# Patient Record
Sex: FEMALE | Race: WHITE | NOT HISPANIC OR LATINO
[De-identification: names, ages, dates, MRNs, and addresses within clinical notes are randomized per-mention and may not be internally consistent; named-entity substitution may affect disease eponyms.]

---

## 2019-04-20 ENCOUNTER — APPOINTMENT (OUTPATIENT)
Dept: RADIOLOGY | Facility: IMAGING CENTER | Age: 84
End: 2019-04-20
Payer: MEDICARE

## 2019-04-20 ENCOUNTER — OUTPATIENT (OUTPATIENT)
Dept: OUTPATIENT SERVICES | Facility: HOSPITAL | Age: 84
LOS: 1 days | End: 2019-04-20
Payer: MEDICARE

## 2019-04-20 DIAGNOSIS — Z00.8 ENCOUNTER FOR OTHER GENERAL EXAMINATION: ICD-10-CM

## 2019-04-20 PROCEDURE — 73502 X-RAY EXAM HIP UNI 2-3 VIEWS: CPT | Mod: 26,RT

## 2019-04-20 PROCEDURE — 73502 X-RAY EXAM HIP UNI 2-3 VIEWS: CPT

## 2019-04-20 PROCEDURE — 73502 X-RAY EXAM HIP UNI 2-3 VIEWS: CPT | Mod: 26,LT,76

## 2023-04-30 ENCOUNTER — OUTPATIENT (OUTPATIENT)
Dept: OUTPATIENT SERVICES | Facility: HOSPITAL | Age: 88
LOS: 1 days | End: 2023-04-30
Payer: MEDICARE

## 2023-04-30 ENCOUNTER — APPOINTMENT (OUTPATIENT)
Dept: CT IMAGING | Facility: IMAGING CENTER | Age: 88
End: 2023-04-30
Payer: MEDICARE

## 2023-04-30 DIAGNOSIS — Z00.8 ENCOUNTER FOR OTHER GENERAL EXAMINATION: ICD-10-CM

## 2023-04-30 PROCEDURE — 70450 CT HEAD/BRAIN W/O DYE: CPT | Mod: 26

## 2023-04-30 PROCEDURE — 70450 CT HEAD/BRAIN W/O DYE: CPT

## 2024-07-14 ENCOUNTER — INPATIENT (INPATIENT)
Facility: HOSPITAL | Age: 89
LOS: 4 days | Discharge: NURSING, REHAB OR LONG-TERM CARE | End: 2024-07-19
Attending: INTERNAL MEDICINE | Admitting: INTERNAL MEDICINE
Payer: MEDICARE

## 2024-07-14 VITALS
SYSTOLIC BLOOD PRESSURE: 129 MMHG | RESPIRATION RATE: 17 BRPM | HEIGHT: 71 IN | OXYGEN SATURATION: 98 % | TEMPERATURE: 98 F | HEART RATE: 79 BPM | WEIGHT: 160.06 LBS | DIASTOLIC BLOOD PRESSURE: 82 MMHG

## 2024-07-14 LAB
ALBUMIN SERPL ELPH-MCNC: 3.4 G/DL — SIGNIFICANT CHANGE UP (ref 3.3–5)
ALP SERPL-CCNC: 48 U/L — SIGNIFICANT CHANGE UP (ref 40–120)
ALT FLD-CCNC: 24 U/L — SIGNIFICANT CHANGE UP (ref 12–78)
ANION GAP SERPL CALC-SCNC: 11 MMOL/L — SIGNIFICANT CHANGE UP (ref 5–17)
APPEARANCE UR: CLEAR — SIGNIFICANT CHANGE UP
AST SERPL-CCNC: 24 U/L — SIGNIFICANT CHANGE UP (ref 15–37)
BASOPHILS # BLD AUTO: 0.01 K/UL — SIGNIFICANT CHANGE UP (ref 0–0.2)
BASOPHILS NFR BLD AUTO: 0.1 % — SIGNIFICANT CHANGE UP (ref 0–2)
BILIRUB SERPL-MCNC: 0.4 MG/DL — SIGNIFICANT CHANGE UP (ref 0.2–1.2)
BILIRUB UR-MCNC: NEGATIVE — SIGNIFICANT CHANGE UP
BUN SERPL-MCNC: 29 MG/DL — HIGH (ref 7–23)
CALCIUM SERPL-MCNC: 9.7 MG/DL — SIGNIFICANT CHANGE UP (ref 8.5–10.1)
CHLORIDE SERPL-SCNC: 94 MMOL/L — LOW (ref 96–108)
CO2 SERPL-SCNC: 27 MMOL/L — SIGNIFICANT CHANGE UP (ref 22–31)
COLOR SPEC: YELLOW — SIGNIFICANT CHANGE UP
CREAT SERPL-MCNC: 1.08 MG/DL — SIGNIFICANT CHANGE UP (ref 0.5–1.3)
DIFF PNL FLD: ABNORMAL
EGFR: 48 ML/MIN/1.73M2 — LOW
EOSINOPHIL # BLD AUTO: 0.11 K/UL — SIGNIFICANT CHANGE UP (ref 0–0.5)
EOSINOPHIL NFR BLD AUTO: 1.5 % — SIGNIFICANT CHANGE UP (ref 0–6)
GLUCOSE SERPL-MCNC: 130 MG/DL — HIGH (ref 70–99)
GLUCOSE UR QL: NEGATIVE MG/DL — SIGNIFICANT CHANGE UP
HCT VFR BLD CALC: 36.2 % — SIGNIFICANT CHANGE UP (ref 34.5–45)
HGB BLD-MCNC: 12.4 G/DL — SIGNIFICANT CHANGE UP (ref 11.5–15.5)
IMM GRANULOCYTES NFR BLD AUTO: 0.5 % — SIGNIFICANT CHANGE UP (ref 0–0.9)
KETONES UR-MCNC: NEGATIVE MG/DL — SIGNIFICANT CHANGE UP
LEUKOCYTE ESTERASE UR-ACNC: ABNORMAL
LYMPHOCYTES # BLD AUTO: 1.36 K/UL — SIGNIFICANT CHANGE UP (ref 1–3.3)
LYMPHOCYTES # BLD AUTO: 18.2 % — SIGNIFICANT CHANGE UP (ref 13–44)
MAGNESIUM SERPL-MCNC: 1.8 MG/DL — SIGNIFICANT CHANGE UP (ref 1.6–2.6)
MCHC RBC-ENTMCNC: 30.8 PG — SIGNIFICANT CHANGE UP (ref 27–34)
MCHC RBC-ENTMCNC: 34.3 G/DL — SIGNIFICANT CHANGE UP (ref 32–36)
MCV RBC AUTO: 90 FL — SIGNIFICANT CHANGE UP (ref 80–100)
MONOCYTES # BLD AUTO: 1.06 K/UL — HIGH (ref 0–0.9)
MONOCYTES NFR BLD AUTO: 14.2 % — HIGH (ref 2–14)
NEUTROPHILS # BLD AUTO: 4.89 K/UL — SIGNIFICANT CHANGE UP (ref 1.8–7.4)
NEUTROPHILS NFR BLD AUTO: 65.5 % — SIGNIFICANT CHANGE UP (ref 43–77)
NITRITE UR-MCNC: NEGATIVE — SIGNIFICANT CHANGE UP
NRBC # BLD: 0 /100 WBCS — SIGNIFICANT CHANGE UP (ref 0–0)
NT-PROBNP SERPL-SCNC: 295 PG/ML — SIGNIFICANT CHANGE UP (ref 0–450)
PH UR: 6 — SIGNIFICANT CHANGE UP (ref 5–8)
PLATELET # BLD AUTO: 175 K/UL — SIGNIFICANT CHANGE UP (ref 150–400)
POTASSIUM SERPL-MCNC: 3.7 MMOL/L — SIGNIFICANT CHANGE UP (ref 3.5–5.3)
POTASSIUM SERPL-SCNC: 3.7 MMOL/L — SIGNIFICANT CHANGE UP (ref 3.5–5.3)
PROT SERPL-MCNC: 6.9 GM/DL — SIGNIFICANT CHANGE UP (ref 6–8.3)
PROT UR-MCNC: SIGNIFICANT CHANGE UP MG/DL
RBC # BLD: 4.02 M/UL — SIGNIFICANT CHANGE UP (ref 3.8–5.2)
RBC # FLD: 13.2 % — SIGNIFICANT CHANGE UP (ref 10.3–14.5)
SODIUM SERPL-SCNC: 132 MMOL/L — LOW (ref 135–145)
SP GR SPEC: 1.02 — SIGNIFICANT CHANGE UP (ref 1–1.03)
TROPONIN I, HIGH SENSITIVITY RESULT: 11.1 NG/L — SIGNIFICANT CHANGE UP
TSH SERPL-MCNC: 1.28 UIU/ML — SIGNIFICANT CHANGE UP (ref 0.36–3.74)
UROBILINOGEN FLD QL: 0.2 MG/DL — SIGNIFICANT CHANGE UP (ref 0.2–1)
WBC # BLD: 7.47 K/UL — SIGNIFICANT CHANGE UP (ref 3.8–10.5)
WBC # FLD AUTO: 7.47 K/UL — SIGNIFICANT CHANGE UP (ref 3.8–10.5)

## 2024-07-14 PROCEDURE — 70450 CT HEAD/BRAIN W/O DYE: CPT | Mod: 26,MC

## 2024-07-14 PROCEDURE — 99285 EMERGENCY DEPT VISIT HI MDM: CPT

## 2024-07-14 PROCEDURE — 71046 X-RAY EXAM CHEST 2 VIEWS: CPT | Mod: 26

## 2024-07-14 PROCEDURE — 93010 ELECTROCARDIOGRAM REPORT: CPT

## 2024-07-14 PROCEDURE — 99223 1ST HOSP IP/OBS HIGH 75: CPT

## 2024-07-14 NOTE — ED ADULT TRIAGE NOTE - CHIEF COMPLAINT QUOTE
more tired than usual for the last few weeks ,not strength, went to urgent care today negative for UTI some + blood in the urine, baseline some confusion, hx stroke , 2 cardiac stent.

## 2024-07-14 NOTE — ED ADULT TRIAGE NOTE - INTERNATIONAL TRAVEL
Problem: Falls - Risk of  Goal: *Absence of Falls  Description: Document Kennedy Fall Risk and appropriate interventions in the flowsheet. Outcome: Progressing Towards Goal  Note: Fall Risk Interventions:            Medication Interventions: Patient to call before getting OOB    Elimination Interventions: Toilet paper/wipes in reach              Problem: Patient Education: Go to Patient Education Activity  Goal: Patient/Family Education  Outcome: Progressing Towards Goal     Problem: Depressed Mood (Adult/Pediatric)  Goal: *STG: Participates in treatment plan  Outcome: Progressing Towards Goal  Note: Pt participated in treatment team. Out on the unit for medications. Isolating in his room. Little interactions with peers. Denies any thoughts of self harm. Stated his back is \"feeling better\".   Goal: Interventions  Outcome: Progressing Towards Goal     Problem: Patient Education: Go to Patient Education Activity  Goal: Patient/Family Education  Outcome: Progressing Towards Goal No

## 2024-07-14 NOTE — ED ADULT NURSE NOTE - NSFALLHARMRISKINTERV_ED_ALL_ED

## 2024-07-14 NOTE — ED ADULT NURSE NOTE - OBJECTIVE STATEMENT
Pt son at the bedside, states that pt more tired than usual for the last week or so, no strength and increasingly confused. Pt went to urgent care today negative for UTI some + blood in the urine, baseline some confusion worsening over the past 3 months, hx stroke , 2 cardiac stent, hypothyroidism. No fevers, N/V/D. Been urinating more frequently. Placed on monitor, NSR.

## 2024-07-15 DIAGNOSIS — E03.9 HYPOTHYROIDISM, UNSPECIFIED: ICD-10-CM

## 2024-07-15 DIAGNOSIS — I10 ESSENTIAL (PRIMARY) HYPERTENSION: ICD-10-CM

## 2024-07-15 DIAGNOSIS — E11.9 TYPE 2 DIABETES MELLITUS WITHOUT COMPLICATIONS: ICD-10-CM

## 2024-07-15 DIAGNOSIS — I25.10 ATHEROSCLEROTIC HEART DISEASE OF NATIVE CORONARY ARTERY WITHOUT ANGINA PECTORIS: ICD-10-CM

## 2024-07-15 DIAGNOSIS — R53.1 WEAKNESS: ICD-10-CM

## 2024-07-15 LAB
ALBUMIN SERPL ELPH-MCNC: 3.1 G/DL — LOW (ref 3.3–5)
ALP SERPL-CCNC: 41 U/L — SIGNIFICANT CHANGE UP (ref 40–120)
ALT FLD-CCNC: 19 U/L — SIGNIFICANT CHANGE UP (ref 12–78)
ANION GAP SERPL CALC-SCNC: 8 MMOL/L — SIGNIFICANT CHANGE UP (ref 5–17)
AST SERPL-CCNC: 22 U/L — SIGNIFICANT CHANGE UP (ref 15–37)
BACTERIA # UR AUTO: ABNORMAL /HPF
BILIRUB SERPL-MCNC: 0.6 MG/DL — SIGNIFICANT CHANGE UP (ref 0.2–1.2)
BUN SERPL-MCNC: 21 MG/DL — SIGNIFICANT CHANGE UP (ref 7–23)
CALCIUM SERPL-MCNC: 9.1 MG/DL — SIGNIFICANT CHANGE UP (ref 8.5–10.1)
CHLORIDE SERPL-SCNC: 95 MMOL/L — LOW (ref 96–108)
CO2 SERPL-SCNC: 28 MMOL/L — SIGNIFICANT CHANGE UP (ref 22–31)
CREAT SERPL-MCNC: 0.85 MG/DL — SIGNIFICANT CHANGE UP (ref 0.5–1.3)
EGFR: 65 ML/MIN/1.73M2 — SIGNIFICANT CHANGE UP
EPI CELLS # UR: PRESENT
FLUAV AG NPH QL: SIGNIFICANT CHANGE UP
FLUBV AG NPH QL: SIGNIFICANT CHANGE UP
GLUCOSE BLDC GLUCOMTR-MCNC: 100 MG/DL — HIGH (ref 70–99)
GLUCOSE BLDC GLUCOMTR-MCNC: 107 MG/DL — HIGH (ref 70–99)
GLUCOSE BLDC GLUCOMTR-MCNC: 119 MG/DL — HIGH (ref 70–99)
GLUCOSE BLDC GLUCOMTR-MCNC: 143 MG/DL — HIGH (ref 70–99)
GLUCOSE BLDC GLUCOMTR-MCNC: 149 MG/DL — HIGH (ref 70–99)
GLUCOSE SERPL-MCNC: 175 MG/DL — HIGH (ref 70–99)
HCT VFR BLD CALC: 33.1 % — LOW (ref 34.5–45)
HGB BLD-MCNC: 11.5 G/DL — SIGNIFICANT CHANGE UP (ref 11.5–15.5)
MCHC RBC-ENTMCNC: 31 PG — SIGNIFICANT CHANGE UP (ref 27–34)
MCHC RBC-ENTMCNC: 34.7 G/DL — SIGNIFICANT CHANGE UP (ref 32–36)
MCV RBC AUTO: 89.2 FL — SIGNIFICANT CHANGE UP (ref 80–100)
NRBC # BLD: 0 /100 WBCS — SIGNIFICANT CHANGE UP (ref 0–0)
PLATELET # BLD AUTO: 155 K/UL — SIGNIFICANT CHANGE UP (ref 150–400)
POTASSIUM SERPL-MCNC: 3 MMOL/L — LOW (ref 3.5–5.3)
POTASSIUM SERPL-SCNC: 3 MMOL/L — LOW (ref 3.5–5.3)
PROT SERPL-MCNC: 6.1 GM/DL — SIGNIFICANT CHANGE UP (ref 6–8.3)
RBC # BLD: 3.71 M/UL — LOW (ref 3.8–5.2)
RBC # FLD: 13.1 % — SIGNIFICANT CHANGE UP (ref 10.3–14.5)
RBC CASTS # UR COMP ASSIST: 4 /HPF — SIGNIFICANT CHANGE UP (ref 0–4)
SARS-COV-2 RNA SPEC QL NAA+PROBE: SIGNIFICANT CHANGE UP
SODIUM SERPL-SCNC: 131 MMOL/L — LOW (ref 135–145)
T4 AB SER-ACNC: 8.3 UG/DL — SIGNIFICANT CHANGE UP (ref 4.6–12)
TSH SERPL-MCNC: 1.06 UU/ML — SIGNIFICANT CHANGE UP (ref 0.36–3.74)
WBC # BLD: 5.39 K/UL — SIGNIFICANT CHANGE UP (ref 3.8–10.5)
WBC # FLD AUTO: 5.39 K/UL — SIGNIFICANT CHANGE UP (ref 3.8–10.5)
WBC UR QL: 6 /HPF — SIGNIFICANT CHANGE UP (ref 0–5)

## 2024-07-15 RX ORDER — DEXTROSE 30 % IN WATER 30 %
25 VIAL (ML) INTRAVENOUS ONCE
Refills: 0 | Status: DISCONTINUED | OUTPATIENT
Start: 2024-07-15 | End: 2024-07-19

## 2024-07-15 RX ORDER — PANTOPRAZOLE SODIUM 40 MG/10ML
40 INJECTION, POWDER, FOR SOLUTION INTRAVENOUS
Refills: 0 | Status: DISCONTINUED | OUTPATIENT
Start: 2024-07-15 | End: 2024-07-19

## 2024-07-15 RX ORDER — METOPROLOL TARTRATE 50 MG
50 TABLET ORAL DAILY
Refills: 0 | Status: DISCONTINUED | OUTPATIENT
Start: 2024-07-15 | End: 2024-07-19

## 2024-07-15 RX ORDER — PANTOPRAZOLE SODIUM 40 MG/10ML
1 INJECTION, POWDER, FOR SOLUTION INTRAVENOUS
Refills: 0 | DISCHARGE

## 2024-07-15 RX ORDER — HEPARIN SODIUM 50 [USP'U]/ML
5000 INJECTION, SOLUTION INTRAVENOUS EVERY 12 HOURS
Refills: 0 | Status: DISCONTINUED | OUTPATIENT
Start: 2024-07-15 | End: 2024-07-19

## 2024-07-15 RX ORDER — ONDANSETRON HYDROCHLORIDE 2 MG/ML
4 INJECTION INTRAMUSCULAR; INTRAVENOUS EVERY 8 HOURS
Refills: 0 | Status: DISCONTINUED | OUTPATIENT
Start: 2024-07-15 | End: 2024-07-19

## 2024-07-15 RX ORDER — ATORVASTATIN CALCIUM 20 MG/1
1 TABLET, FILM COATED ORAL
Refills: 0 | DISCHARGE

## 2024-07-15 RX ORDER — POTASSIUM CHLORIDE 600 MG/1
40 TABLET, FILM COATED, EXTENDED RELEASE ORAL EVERY 4 HOURS
Refills: 0 | Status: COMPLETED | OUTPATIENT
Start: 2024-07-15 | End: 2024-07-15

## 2024-07-15 RX ORDER — ACETAMINOPHEN 325 MG
650 TABLET ORAL EVERY 6 HOURS
Refills: 0 | Status: DISCONTINUED | OUTPATIENT
Start: 2024-07-15 | End: 2024-07-19

## 2024-07-15 RX ORDER — GLUCAGON HYDROCHLORIDE 1 MG/ML
1 INJECTION, POWDER, FOR SOLUTION INTRAMUSCULAR; INTRAVENOUS; SUBCUTANEOUS ONCE
Refills: 0 | Status: DISCONTINUED | OUTPATIENT
Start: 2024-07-15 | End: 2024-07-19

## 2024-07-15 RX ORDER — DEXTROSE MONOHYDRATE AND SODIUM CHLORIDE 5; .3 G/100ML; G/100ML
1000 INJECTION, SOLUTION INTRAVENOUS
Refills: 0 | Status: DISCONTINUED | OUTPATIENT
Start: 2024-07-15 | End: 2024-07-19

## 2024-07-15 RX ORDER — DEXTROSE 30 % IN WATER 30 %
12.5 VIAL (ML) INTRAVENOUS ONCE
Refills: 0 | Status: DISCONTINUED | OUTPATIENT
Start: 2024-07-15 | End: 2024-07-19

## 2024-07-15 RX ORDER — MAGNESIUM, ALUMINUM HYDROXIDE 400-400
30 TABLET,CHEWABLE ORAL EVERY 4 HOURS
Refills: 0 | Status: DISCONTINUED | OUTPATIENT
Start: 2024-07-15 | End: 2024-07-19

## 2024-07-15 RX ORDER — CLOPIDOGREL BISULFATE 75 MG/1
1 TABLET, FILM COATED ORAL
Refills: 0 | DISCHARGE

## 2024-07-15 RX ORDER — INSULIN LISPRO 100 [IU]/ML
INJECTION, SOLUTION SUBCUTANEOUS
Refills: 0 | Status: DISCONTINUED | OUTPATIENT
Start: 2024-07-15 | End: 2024-07-19

## 2024-07-15 RX ORDER — METOPROLOL TARTRATE 50 MG
1 TABLET ORAL
Refills: 0 | DISCHARGE

## 2024-07-15 RX ORDER — METFORMIN HYDROCHLORIDE 850 MG/1
1 TABLET, FILM COATED ORAL
Refills: 0 | DISCHARGE

## 2024-07-15 RX ORDER — ATORVASTATIN CALCIUM 20 MG/1
10 TABLET, FILM COATED ORAL AT BEDTIME
Refills: 0 | Status: DISCONTINUED | OUTPATIENT
Start: 2024-07-15 | End: 2024-07-19

## 2024-07-15 RX ORDER — POTASSIUM CHLORIDE 600 MG/1
40 TABLET, FILM COATED, EXTENDED RELEASE ORAL ONCE
Refills: 0 | Status: COMPLETED | OUTPATIENT
Start: 2024-07-15 | End: 2024-07-15

## 2024-07-15 RX ORDER — LISINOPRIL 5 MG/1
1 TABLET ORAL
Refills: 0 | DISCHARGE

## 2024-07-15 RX ORDER — LEVOTHYROXINE SODIUM 25 MCG
1 TABLET ORAL
Refills: 0 | DISCHARGE

## 2024-07-15 RX ORDER — SODIUM CHLORIDE 0.9 % (FLUSH) 0.9 %
1000 SYRINGE (ML) INJECTION
Refills: 0 | Status: DISCONTINUED | OUTPATIENT
Start: 2024-07-15 | End: 2024-07-15

## 2024-07-15 RX ORDER — LEVOTHYROXINE SODIUM 25 MCG
75 TABLET ORAL DAILY
Refills: 0 | Status: DISCONTINUED | OUTPATIENT
Start: 2024-07-15 | End: 2024-07-19

## 2024-07-15 RX ORDER — CLOPIDOGREL BISULFATE 75 MG/1
75 TABLET, FILM COATED ORAL DAILY
Refills: 0 | Status: DISCONTINUED | OUTPATIENT
Start: 2024-07-15 | End: 2024-07-19

## 2024-07-15 RX ORDER — LISINOPRIL 5 MG/1
40 TABLET ORAL DAILY
Refills: 0 | Status: DISCONTINUED | OUTPATIENT
Start: 2024-07-15 | End: 2024-07-19

## 2024-07-15 RX ORDER — DEXTROSE 30 % IN WATER 30 %
15 VIAL (ML) INTRAVENOUS ONCE
Refills: 0 | Status: DISCONTINUED | OUTPATIENT
Start: 2024-07-15 | End: 2024-07-19

## 2024-07-15 RX ADMIN — ATORVASTATIN CALCIUM 10 MILLIGRAM(S): 20 TABLET, FILM COATED ORAL at 22:41

## 2024-07-15 RX ADMIN — PANTOPRAZOLE SODIUM 40 MILLIGRAM(S): 40 INJECTION, POWDER, FOR SOLUTION INTRAVENOUS at 06:52

## 2024-07-15 RX ADMIN — POTASSIUM CHLORIDE 40 MILLIEQUIVALENT(S): 600 TABLET, FILM COATED, EXTENDED RELEASE ORAL at 13:46

## 2024-07-15 RX ADMIN — Medication 75 MICROGRAM(S): at 06:51

## 2024-07-15 RX ADMIN — CLOPIDOGREL BISULFATE 75 MILLIGRAM(S): 75 TABLET, FILM COATED ORAL at 11:19

## 2024-07-15 RX ADMIN — Medication 75 MILLILITER(S): at 03:35

## 2024-07-15 RX ADMIN — HEPARIN SODIUM 5000 UNIT(S): 50 INJECTION, SOLUTION INTRAVENOUS at 18:08

## 2024-07-15 RX ADMIN — HEPARIN SODIUM 5000 UNIT(S): 50 INJECTION, SOLUTION INTRAVENOUS at 06:52

## 2024-07-15 RX ADMIN — POTASSIUM CHLORIDE 40 MILLIEQUIVALENT(S): 600 TABLET, FILM COATED, EXTENDED RELEASE ORAL at 11:19

## 2024-07-15 RX ADMIN — POTASSIUM CHLORIDE 40 MILLIEQUIVALENT(S): 600 TABLET, FILM COATED, EXTENDED RELEASE ORAL at 18:09

## 2024-07-15 NOTE — ED PROVIDER NOTE - PHYSICAL EXAMINATION
Gen: aox3, nad,   Head: NCAT  ENT: Airway patent, moist mucous membranes, nasal passageways clear, EOMI   Cardiac: Normal rate, normal rhythm, no murmurs   Respiratory: Lungs CTA B/L  Gastrointestinal: Abdomen soft, nontender, nondistended, no rebound, no guarding  MSK: No gross abnormalities, FROM of all four extremities, no edema  HEME: Extremities warm, pulses intact and symmetrical in all four extremities  Skin: No rashes, no lesions  Neuro: No gross neurologic deficits, CN II-XII intact, no facial asymmetry, no dysarthria, no dysmetria, no drift, strength equal in all four extremities, needs assistance with gait- no overt abnormalities with gait

## 2024-07-15 NOTE — H&P ADULT - NSHPPHYSICALEXAM_GEN_ALL_CORE
GENERAL: NAD, comfortable at bedside   HEAD:  Atraumatic, Normocephalic  EYES: EOMI, PERRL, conjunctiva and sclera clear  NECK: Supple, No JVD  LUNG: Clear to auscultation bilaterally; No wheezes, rales or rhonchi  HEART: Regular rate and rhythm; No murmurs, rubs, or gallops  ABDOMEN: +BS, Soft, Nontender, Nondistended  EXTREMITIES:  No clubbing, cyanosis, or edema  PSYCH: normal mood and affect  NEUROLOGY: AAOx3, non-focal   SKIN: No rashes or lesions

## 2024-07-15 NOTE — ED PROVIDER NOTE - NS ED MD DISPO OBSERVATION UNIT
Anesthesia Post Evaluation    Patient: Jerod Barrientos    Procedure(s) Performed: Procedure(s) (LRB):  EGD (ESOPHAGOGASTRODUODENOSCOPY) (N/A)    Final Anesthesia Type: general      Patient location during evaluation: PACU  Patient participation: Yes- Able to Participate  Level of consciousness: awake and alert  Post-procedure vital signs: reviewed and stable  Pain management: adequate  Airway patency: patent    PONV status at discharge: No PONV  Anesthetic complications: no      Cardiovascular status: hemodynamically stable  Respiratory status: unassisted and room air  Hydration status: euvolemic  Follow-up not needed.          Vitals Value Taken Time   /77 07/06/23 1140   Temp 36.8 °C (98.2 °F) 07/06/23 1140   Pulse 51 07/06/23 1140   Resp 18 07/06/23 1126   SpO2 97 % 07/06/23 1140         Event Time   Out of Recovery 12:56:52         Pain/Sanket Score: Sanket Score: 10 (7/6/2023 12:13 PM)         TELEMETRY

## 2024-07-15 NOTE — ED PROVIDER NOTE - CLINICAL SUMMARY MEDICAL DECISION MAKING FREE TEXT BOX
89F pmhx of CAD, CVA, who presents for evaluation of fatigue, generalized weakness, apparent worse with exertional activity, duration x 2 weeks, acute decline from baseline with intermittent confusion

## 2024-07-15 NOTE — H&P ADULT - NSHPLABSRESULTS_GEN_ALL_CORE
12.4   7.47  )-----------( 175      ( 2024 21:34 )             36.2     132<L>  |  94<L>  |  29<H>  ----------------------------<  130<H>       3.7   |  27  |  1.08    Ca    9.7      2024 21:34  Mg     1.8         TPro  6.9  /  Alb  3.4  /  TBili  0.4  /  DBili  x   /  AST  24  /  ALT  24  /  AlkPhos  48          Pro-BNP: 295 (24 @ 21:34)      Urinalysis Basic - ( 2024 22:28 )  Color: Yellow / Appearance: Clear / S.017 / pH: 6.0  Gluc: Negative mg/dL / Ketone: Negative mg/dL  / Bili: Negative / Urobili: 0.2 mg/dL   Blood: Trace / Protein: Trace mg/dL / Nitrite: Negative   Leuk Esterase: Small / RBC: 4 /HPF / WBC 6 /HPF   Sq Epi: x / Bacteria: Few /HPF  Hyaline Casts: x/WBC Casts: x        CT-Head  VENTRICLES AND SULCI: Age appropriate involutional changes.  INTRA-AXIAL: No intraparenchymal mass, acute hemorrhage, or midline shift. There are periventricular and subcortical white matter hypodensities, consistent with microvascular type changes. Revisualized small posterior right corona radiata lacunar infarct and small area of gliosis in the right anterior corona radiata.  EXTRA-AXIAL: No mass or fluid collection. Basal cisterns are normal in appearance.    VISUALIZED SINUSES: Scattered mucosal thickening.  TYMPANOMASTOID CAVITIES: Clear.  VISUALIZED ORBITS: Normal.  CALVARIUM: Intact.    MISCELLANEOUS: Stable calcified nodule in the high right frontal scalp.      IMPRESSION:  No acute intracranial hemorrhage, mass effect, or midline shift.

## 2024-07-15 NOTE — H&P ADULT - ASSESSMENT
89-year-old female with a PMH of CVA, CAD s/p stent, hypothyroidism presents to the ED to be evaluated for weakness. As per son, for the past 3 weeks patient has appeared more tired than usual, has been watching patient via video camera and does not appear like her normal self. Also has been having some period of confusion over the past 3 months. Patient lives at home with her . Upon evaluation, patient is AAOx3, NAD, endorses she is feeling well, ambulates with assistant without difficulty. Only endorses a mild cough that stated today. RVP panel is negative. Labs unremarkable. Hemodynamically stable. CT-Head: No acute intracranial hemorrhage, mass effect, or midline shift.  Patient is being placed on observation as family member does not feel comfortable for patient to go home.

## 2024-07-15 NOTE — ED PROVIDER NOTE - NS ED MD DISPO DIVISION OBS
and signs suggestive of diabetic neuropathy of the bilateral feet. On exam patient alert and nontoxic-appearing. No clinical signs of fracture or dislocation that warrants radiographic study. No clinical symptoms signs suggestive of infection such as gangrene or cellulitis. Patient was symptomatically treated for her diabetic neuropathy with gabapentin. A dose of clonidine was given for elevated blood pressure reading which she responded well the patient is asymptomatic without headache, chest pain, shortness of breath, abdominal pain. Patient was discharged with a course of gabapentin and I was unable to prescribe capsaicin cream.  Patient was advised to follow-up with a primary to monitor blood pressure and further evaluation treatment of the diabetic neuropathy. Vital Signs-Reviewed the patient's vital signs. [unfilled]    Records Reviewed:  Available old ER/admission charts reviewed    ED Course:   Initial assessment performed. The patients presenting problems have been discussed, and they are in agreement with the care plan formulated and outlined with them. I have encouraged them to ask questions as they arise throughout their visit. Pain improved. Blood pressure improved. PROCEDURES      Disposition: Condition stable   DC- Adult Discharges: All of the diagnostic tests were reviewed and questions answered. Diagnosis, care plan and treatment options were discussed. understand instructions and will follow up as directed. The patients results have been reviewed with them. They have been counseled regarding their diagnosis. The patient verbally convey understanding and agreement of the signs, symptoms, diagnosis, treatment and prognosis and additionally agrees to follow up as recommended. They also agree with the care-plan and convey that all of their questions have been answered.   I have also put together some discharge instructions for them that include: 1) educational information [facility], [place]

## 2024-07-16 LAB
A1C WITH ESTIMATED AVERAGE GLUCOSE RESULT: 6.4 % — HIGH (ref 4–5.6)
ALBUMIN SERPL ELPH-MCNC: 3.3 G/DL — SIGNIFICANT CHANGE UP (ref 3.3–5)
ALP SERPL-CCNC: 47 U/L — SIGNIFICANT CHANGE UP (ref 40–120)
ALT FLD-CCNC: 22 U/L — SIGNIFICANT CHANGE UP (ref 12–78)
ANION GAP SERPL CALC-SCNC: 8 MMOL/L — SIGNIFICANT CHANGE UP (ref 5–17)
AST SERPL-CCNC: 26 U/L — SIGNIFICANT CHANGE UP (ref 15–37)
BILIRUB SERPL-MCNC: 0.5 MG/DL — SIGNIFICANT CHANGE UP (ref 0.2–1.2)
BUN SERPL-MCNC: 17 MG/DL — SIGNIFICANT CHANGE UP (ref 7–23)
CALCIUM SERPL-MCNC: 9.4 MG/DL — SIGNIFICANT CHANGE UP (ref 8.5–10.1)
CHLORIDE SERPL-SCNC: 97 MMOL/L — SIGNIFICANT CHANGE UP (ref 96–108)
CHOLEST SERPL-MCNC: 148 MG/DL — SIGNIFICANT CHANGE UP
CO2 SERPL-SCNC: 29 MMOL/L — SIGNIFICANT CHANGE UP (ref 22–31)
CREAT SERPL-MCNC: 0.84 MG/DL — SIGNIFICANT CHANGE UP (ref 0.5–1.3)
CULTURE RESULTS: SIGNIFICANT CHANGE UP
EGFR: 66 ML/MIN/1.73M2 — SIGNIFICANT CHANGE UP
ESTIMATED AVERAGE GLUCOSE: 137 MG/DL — HIGH (ref 68–114)
GLUCOSE BLDC GLUCOMTR-MCNC: 102 MG/DL — HIGH (ref 70–99)
GLUCOSE BLDC GLUCOMTR-MCNC: 105 MG/DL — HIGH (ref 70–99)
GLUCOSE BLDC GLUCOMTR-MCNC: 108 MG/DL — HIGH (ref 70–99)
GLUCOSE BLDC GLUCOMTR-MCNC: 187 MG/DL — HIGH (ref 70–99)
GLUCOSE SERPL-MCNC: 117 MG/DL — HIGH (ref 70–99)
HCT VFR BLD CALC: 35.8 % — SIGNIFICANT CHANGE UP (ref 34.5–45)
HDLC SERPL-MCNC: 49 MG/DL — LOW
HGB BLD-MCNC: 12.2 G/DL — SIGNIFICANT CHANGE UP (ref 11.5–15.5)
LIPID PNL WITH DIRECT LDL SERPL: 79 MG/DL — SIGNIFICANT CHANGE UP
MCHC RBC-ENTMCNC: 30.4 PG — SIGNIFICANT CHANGE UP (ref 27–34)
MCHC RBC-ENTMCNC: 34.1 G/DL — SIGNIFICANT CHANGE UP (ref 32–36)
MCV RBC AUTO: 89.3 FL — SIGNIFICANT CHANGE UP (ref 80–100)
NON HDL CHOLESTEROL: 99 MG/DL — SIGNIFICANT CHANGE UP
NRBC # BLD: 0 /100 WBCS — SIGNIFICANT CHANGE UP (ref 0–0)
PLATELET # BLD AUTO: 173 K/UL — SIGNIFICANT CHANGE UP (ref 150–400)
POTASSIUM SERPL-MCNC: 3.1 MMOL/L — LOW (ref 3.5–5.3)
POTASSIUM SERPL-SCNC: 3.1 MMOL/L — LOW (ref 3.5–5.3)
PROT SERPL-MCNC: 6.8 GM/DL — SIGNIFICANT CHANGE UP (ref 6–8.3)
RBC # BLD: 4.01 M/UL — SIGNIFICANT CHANGE UP (ref 3.8–5.2)
RBC # FLD: 13.1 % — SIGNIFICANT CHANGE UP (ref 10.3–14.5)
SODIUM SERPL-SCNC: 134 MMOL/L — LOW (ref 135–145)
SPECIMEN SOURCE: SIGNIFICANT CHANGE UP
TRIGL SERPL-MCNC: 108 MG/DL — SIGNIFICANT CHANGE UP
WBC # BLD: 5.2 K/UL — SIGNIFICANT CHANGE UP (ref 3.8–10.5)
WBC # FLD AUTO: 5.2 K/UL — SIGNIFICANT CHANGE UP (ref 3.8–10.5)

## 2024-07-16 PROCEDURE — 99232 SBSQ HOSP IP/OBS MODERATE 35: CPT

## 2024-07-16 RX ORDER — POTASSIUM CHLORIDE 600 MG/1
40 TABLET, FILM COATED, EXTENDED RELEASE ORAL ONCE
Refills: 0 | Status: COMPLETED | OUTPATIENT
Start: 2024-07-16 | End: 2024-07-16

## 2024-07-16 RX ORDER — POTASSIUM CHLORIDE 600 MG/1
40 TABLET, FILM COATED, EXTENDED RELEASE ORAL EVERY 4 HOURS
Refills: 0 | Status: COMPLETED | OUTPATIENT
Start: 2024-07-16 | End: 2024-07-16

## 2024-07-16 RX ADMIN — HEPARIN SODIUM 5000 UNIT(S): 50 INJECTION, SOLUTION INTRAVENOUS at 06:27

## 2024-07-16 RX ADMIN — POTASSIUM CHLORIDE 40 MILLIEQUIVALENT(S): 600 TABLET, FILM COATED, EXTENDED RELEASE ORAL at 22:01

## 2024-07-16 RX ADMIN — ATORVASTATIN CALCIUM 10 MILLIGRAM(S): 20 TABLET, FILM COATED ORAL at 22:01

## 2024-07-16 RX ADMIN — CLOPIDOGREL BISULFATE 75 MILLIGRAM(S): 75 TABLET, FILM COATED ORAL at 11:50

## 2024-07-16 RX ADMIN — POTASSIUM CHLORIDE 40 MILLIEQUIVALENT(S): 600 TABLET, FILM COATED, EXTENDED RELEASE ORAL at 18:09

## 2024-07-16 RX ADMIN — Medication 75 MICROGRAM(S): at 06:27

## 2024-07-16 RX ADMIN — POTASSIUM CHLORIDE 40 MILLIEQUIVALENT(S): 600 TABLET, FILM COATED, EXTENDED RELEASE ORAL at 10:31

## 2024-07-16 RX ADMIN — POTASSIUM CHLORIDE 40 MILLIEQUIVALENT(S): 600 TABLET, FILM COATED, EXTENDED RELEASE ORAL at 15:50

## 2024-07-16 RX ADMIN — PANTOPRAZOLE SODIUM 40 MILLIGRAM(S): 40 INJECTION, POWDER, FOR SOLUTION INTRAVENOUS at 06:27

## 2024-07-16 RX ADMIN — HEPARIN SODIUM 5000 UNIT(S): 50 INJECTION, SOLUTION INTRAVENOUS at 18:09

## 2024-07-16 RX ADMIN — INSULIN LISPRO 2: 100 INJECTION, SOLUTION SUBCUTANEOUS at 11:49

## 2024-07-16 NOTE — PHYSICAL THERAPY INITIAL EVALUATION ADULT - FOLLOWS COMMANDS/ANSWERS QUESTIONS, REHAB EVAL
carryover with verbal cueing and manual guidance/75% of the time/able to follow multistep instructions/able to follow single-step instructions

## 2024-07-16 NOTE — PROGRESS NOTE ADULT - PROBLEM SELECTOR PLAN 2
Normotensive   Continue home meds   - Lisinopril   - Metoprolol succ  - Monitor BP    replace hypokalemia

## 2024-07-16 NOTE — PHYSICAL THERAPY INITIAL EVALUATION ADULT - GENERAL OBSERVATIONS, REHAB EVAL
emr reviewed and events noted to date. pt encountered semi-reclined, alert and oriented x2, NAD. [place]  [id number] used to assist in communication with pt. Pt reporting no pain, carryover with verbal cueing for transfers, anbulation and rolling walker use.

## 2024-07-16 NOTE — PROGRESS NOTE ADULT - PROBLEM SELECTOR PLAN 1
3 weeks of progressive weakness   No fall, No trauma   - IV-NS   - Monitor  - DVT Prophylaxis    JOSHUA placement

## 2024-07-16 NOTE — PHYSICAL THERAPY INITIAL EVALUATION ADULT - TRANSFER SAFETY CONCERNS NOTED: SIT/STAND, REHAB EVAL
Poor eccentric control/decreased balance during turns/decreased safety awareness/losing balance/decreased weight-shifting ability

## 2024-07-16 NOTE — PHYSICAL THERAPY INITIAL EVALUATION ADULT - ADDITIONAL COMMENTS
pt lives on first floor with 4 steps to enter. Pt uses a cane, rolling walker at home and rollator when out. Pt has a home health aide x4 hours a day. Pt has a falls history. Pt was able to ambulate short distances in the community.

## 2024-07-17 LAB
ANION GAP SERPL CALC-SCNC: 6 MMOL/L — SIGNIFICANT CHANGE UP (ref 5–17)
BUN SERPL-MCNC: 12 MG/DL — SIGNIFICANT CHANGE UP (ref 7–23)
CALCIUM SERPL-MCNC: 10.1 MG/DL — SIGNIFICANT CHANGE UP (ref 8.5–10.1)
CHLORIDE SERPL-SCNC: 103 MMOL/L — SIGNIFICANT CHANGE UP (ref 96–108)
CO2 SERPL-SCNC: 27 MMOL/L — SIGNIFICANT CHANGE UP (ref 22–31)
CREAT SERPL-MCNC: 0.85 MG/DL — SIGNIFICANT CHANGE UP (ref 0.5–1.3)
EGFR: 65 ML/MIN/1.73M2 — SIGNIFICANT CHANGE UP
GLUCOSE BLDC GLUCOMTR-MCNC: 106 MG/DL — HIGH (ref 70–99)
GLUCOSE BLDC GLUCOMTR-MCNC: 117 MG/DL — HIGH (ref 70–99)
GLUCOSE BLDC GLUCOMTR-MCNC: 119 MG/DL — HIGH (ref 70–99)
GLUCOSE BLDC GLUCOMTR-MCNC: 182 MG/DL — HIGH (ref 70–99)
GLUCOSE SERPL-MCNC: 113 MG/DL — HIGH (ref 70–99)
POTASSIUM SERPL-MCNC: 4.7 MMOL/L — SIGNIFICANT CHANGE UP (ref 3.5–5.3)
POTASSIUM SERPL-SCNC: 4.7 MMOL/L — SIGNIFICANT CHANGE UP (ref 3.5–5.3)
SODIUM SERPL-SCNC: 136 MMOL/L — SIGNIFICANT CHANGE UP (ref 135–145)

## 2024-07-17 PROCEDURE — 99232 SBSQ HOSP IP/OBS MODERATE 35: CPT

## 2024-07-17 RX ADMIN — HEPARIN SODIUM 5000 UNIT(S): 50 INJECTION, SOLUTION INTRAVENOUS at 06:52

## 2024-07-17 RX ADMIN — INSULIN LISPRO 2: 100 INJECTION, SOLUTION SUBCUTANEOUS at 11:46

## 2024-07-17 RX ADMIN — HEPARIN SODIUM 5000 UNIT(S): 50 INJECTION, SOLUTION INTRAVENOUS at 17:29

## 2024-07-17 RX ADMIN — Medication 75 MICROGRAM(S): at 06:52

## 2024-07-17 RX ADMIN — PANTOPRAZOLE SODIUM 40 MILLIGRAM(S): 40 INJECTION, POWDER, FOR SOLUTION INTRAVENOUS at 06:52

## 2024-07-17 RX ADMIN — CLOPIDOGREL BISULFATE 75 MILLIGRAM(S): 75 TABLET, FILM COATED ORAL at 11:46

## 2024-07-17 RX ADMIN — Medication 50 MILLIGRAM(S): at 06:53

## 2024-07-17 RX ADMIN — ATORVASTATIN CALCIUM 10 MILLIGRAM(S): 20 TABLET, FILM COATED ORAL at 21:13

## 2024-07-17 RX ADMIN — LISINOPRIL 40 MILLIGRAM(S): 5 TABLET ORAL at 06:53

## 2024-07-18 LAB
GLUCOSE BLDC GLUCOMTR-MCNC: 110 MG/DL — HIGH (ref 70–99)
GLUCOSE BLDC GLUCOMTR-MCNC: 127 MG/DL — HIGH (ref 70–99)
GLUCOSE BLDC GLUCOMTR-MCNC: 154 MG/DL — HIGH (ref 70–99)
GLUCOSE BLDC GLUCOMTR-MCNC: 91 MG/DL — SIGNIFICANT CHANGE UP (ref 70–99)

## 2024-07-18 PROCEDURE — 99232 SBSQ HOSP IP/OBS MODERATE 35: CPT

## 2024-07-18 RX ORDER — IPRATROPIUM BROMIDE AND ALBUTEROL SULFATE .5; 3 MG/3ML; MG/3ML
3 SOLUTION RESPIRATORY (INHALATION) ONCE
Refills: 0 | Status: DISCONTINUED | OUTPATIENT
Start: 2024-07-18 | End: 2024-07-18

## 2024-07-18 RX ORDER — LEVALBUTEROL HYDROCHLORIDE 1.25 MG/3ML
0.63 SOLUTION RESPIRATORY (INHALATION) ONCE
Refills: 0 | Status: COMPLETED | OUTPATIENT
Start: 2024-07-18 | End: 2024-07-18

## 2024-07-18 RX ADMIN — LEVALBUTEROL HYDROCHLORIDE 0.63 MILLIGRAM(S): 1.25 SOLUTION RESPIRATORY (INHALATION) at 12:03

## 2024-07-18 RX ADMIN — CLOPIDOGREL BISULFATE 75 MILLIGRAM(S): 75 TABLET, FILM COATED ORAL at 12:15

## 2024-07-18 RX ADMIN — LISINOPRIL 40 MILLIGRAM(S): 5 TABLET ORAL at 09:54

## 2024-07-18 RX ADMIN — HEPARIN SODIUM 5000 UNIT(S): 50 INJECTION, SOLUTION INTRAVENOUS at 05:01

## 2024-07-18 RX ADMIN — Medication 50 MILLIGRAM(S): at 05:01

## 2024-07-18 RX ADMIN — PANTOPRAZOLE SODIUM 40 MILLIGRAM(S): 40 INJECTION, POWDER, FOR SOLUTION INTRAVENOUS at 05:00

## 2024-07-18 RX ADMIN — ATORVASTATIN CALCIUM 10 MILLIGRAM(S): 20 TABLET, FILM COATED ORAL at 21:14

## 2024-07-18 RX ADMIN — Medication 75 MICROGRAM(S): at 05:01

## 2024-07-18 RX ADMIN — HEPARIN SODIUM 5000 UNIT(S): 50 INJECTION, SOLUTION INTRAVENOUS at 17:28

## 2024-07-18 RX ADMIN — INSULIN LISPRO 2: 100 INJECTION, SOLUTION SUBCUTANEOUS at 12:15

## 2024-07-19 ENCOUNTER — TRANSCRIPTION ENCOUNTER (OUTPATIENT)
Age: 89
End: 2024-07-19

## 2024-07-19 VITALS
SYSTOLIC BLOOD PRESSURE: 108 MMHG | DIASTOLIC BLOOD PRESSURE: 70 MMHG | HEART RATE: 66 BPM | RESPIRATION RATE: 18 BRPM | TEMPERATURE: 98 F | OXYGEN SATURATION: 97 %

## 2024-07-19 LAB
GLUCOSE BLDC GLUCOMTR-MCNC: 114 MG/DL — HIGH (ref 70–99)
GLUCOSE BLDC GLUCOMTR-MCNC: 119 MG/DL — HIGH (ref 70–99)

## 2024-07-19 PROCEDURE — 99239 HOSP IP/OBS DSCHRG MGMT >30: CPT

## 2024-07-19 RX ORDER — METOLAZONE 5 MG/1
1 TABLET ORAL
Refills: 0 | DISCHARGE

## 2024-07-19 RX ADMIN — Medication 75 MICROGRAM(S): at 05:06

## 2024-07-19 RX ADMIN — HEPARIN SODIUM 5000 UNIT(S): 50 INJECTION, SOLUTION INTRAVENOUS at 05:05

## 2024-07-19 RX ADMIN — PANTOPRAZOLE SODIUM 40 MILLIGRAM(S): 40 INJECTION, POWDER, FOR SOLUTION INTRAVENOUS at 06:10

## 2024-07-19 RX ADMIN — CLOPIDOGREL BISULFATE 75 MILLIGRAM(S): 75 TABLET, FILM COATED ORAL at 12:49

## 2024-07-19 RX ADMIN — LISINOPRIL 40 MILLIGRAM(S): 5 TABLET ORAL at 09:53

## 2024-07-19 RX ADMIN — Medication 50 MILLIGRAM(S): at 05:06

## 2024-07-19 NOTE — PROGRESS NOTE ADULT - ASSESSMENT
89-year-old female with a PMH of CVA, CAD s/p stent, hypothyroidism presents to the ED to be evaluated for weakness. As per son, for the past 3 weeks patient has appeared more tired than usual, has been watching patient via video camera and does not appear like her normal self. Also has been having some period of confusion over the past 3 months. Patient lives at home with her . Upon evaluation, patient is AAOx3, NAD, endorses she is feeling well, ambulates with assistant without difficulty. Only endorses a mild cough that stated today. RVP panel is negative. Labs unremarkable. Hemodynamically stable. CT-Head: No acute intracranial hemorrhage, mass effect, or midline shift.  Patient is being placed on observation as family member does not feel comfortable for patient to go home.        Problem/Plan - 1:  ·  Problem: General weakness.   ·  Plan: 3 weeks of progressive weakness   No fall, No trauma   - IV-NS   - Monitor  - DVT Prophylaxis    JOSHUA placement.     Problem/Plan - 2:  ·  Problem: Hypertension.   ·  Plan: Normotensive   Continue home meds   - Lisinopril   - Metoprolol succ  - Monitor BP       Problem/Plan - 3:  ·  Problem: Diabetes mellitus.   ·  Plan: ISS with hypoglycemia protocol   - F/U A1c.     Problem/Plan - 4:  ·  Problem: CAD (coronary artery disease).   ·  Plan: Continue home meds   - Plavix   - Statin.     Problem/Plan - 5:  ·  Problem: Hypothyroid.   ·  Plan: Continue home meds   - Synthroid   - F/U Labs.    Stable for JOSHUA placement  Pt in agreement
89-year-old female with a PMH of CVA, CAD s/p stent, hypothyroidism presents to the ED to be evaluated for weakness. As per son, for the past 3 weeks patient has appeared more tired than usual, has been watching patient via video camera and does not appear like her normal self. Also has been having some period of confusion over the past 3 months. Patient lives at home with her . Upon evaluation, patient is AAOx3, NAD, endorses she is feeling well, ambulates with assistant without difficulty. Only endorses a mild cough that stated today. RVP panel is negative. Labs unremarkable. Hemodynamically stable. CT-Head: No acute intracranial hemorrhage, mass effect, or midline shift.  Patient is being placed on observation as family member does not feel comfortable for patient to go home.        Problem/Plan - 1:  ·  Problem: General weakness.   ·  Plan: 3 weeks of progressive weakness   No fall, No trauma   - IV-NS   - Monitor  - DVT Prophylaxis    JOSHUA placement.     Problem/Plan - 2:  ·  Problem: Hypertension.   ·  Plan: Normotensive   Continue home meds   - Lisinopril   - Metoprolol succ  - Monitor BP    replace hypokalemia.     Problem/Plan - 3:  ·  Problem: Diabetes mellitus.   ·  Plan: ISS with hypoglycemia protocol   - F/U A1c.     Problem/Plan - 4:  ·  Problem: CAD (coronary artery disease).   ·  Plan: Continue home meds   - Plavix   - Statin.     Problem/Plan - 5:  ·  Problem: Hypothyroid.   ·  Plan: Continue home meds   - Synthroid   - F/U Labs.    Awaiting JOSHUA placement  Pt in agreement
89-year-old female with a PMH of CVA, CAD s/p stent, hypothyroidism presents to the ED to be evaluated for weakness. As per son, for the past 3 weeks patient has appeared more tired than usual, has been watching patient via video camera and does not appear like her normal self. Also has been having some period of confusion over the past 3 months. Patient lives at home with her . Upon evaluation, patient is AAOx3, NAD, endorses she is feeling well, ambulates with assistant without difficulty. Only endorses a mild cough that stated today. RVP panel is negative. Labs unremarkable. Hemodynamically stable. CT-Head: No acute intracranial hemorrhage, mass effect, or midline shift.  Patient is being placed on observation as family member does not feel comfortable for patient to go home.        Problem/Plan - 1:  ·  Problem: General weakness.   ·  Plan: 3 weeks of progressive weakness   No fall, No trauma   - IV-NS   - Monitor  - DVT Prophylaxis    JSOHUA placement.     Problem/Plan - 2:  ·  Problem: Hypertension.   ·  Plan: Normotensive   Continue home meds   - Lisinopril   - Metoprolol succ  - Monitor BP    replace hypokalemia.     Problem/Plan - 3:  ·  Problem: Diabetes mellitus.   ·  Plan: ISS with hypoglycemia protocol   - F/U A1c.     Problem/Plan - 4:  ·  Problem: CAD (coronary artery disease).   ·  Plan: Continue home meds   - Plavix   - Statin.     Problem/Plan - 5:  ·  Problem: Hypothyroid.   ·  Plan: Continue home meds   - Synthroid   - F/U Labs.    Awaiting JOSHUA placement  Pt in agreement
89-year-old female with a PMH of CVA, CAD s/p stent, hypothyroidism presents to the ED to be evaluated for weakness. As per son, for the past 3 weeks patient has appeared more tired than usual, has been watching patient via video camera and does not appear like her normal self. Also has been having some period of confusion over the past 3 months. Patient lives at home with her . Upon evaluation, patient is AAOx3, NAD, endorses she is feeling well, ambulates with assistant without difficulty. Only endorses a mild cough that stated today. RVP panel is negative. Labs unremarkable. Hemodynamically stable. CT-Head: No acute intracranial hemorrhage, mass effect, or midline shift.  Patient is being placed on observation as family member does not feel comfortable for patient to go home.

## 2024-07-19 NOTE — DISCHARGE NOTE PROVIDER - HOSPITAL COURSE
89-year-old female with a PMH of CVA, CAD s/p stent, hypothyroidism presents to the ED to be evaluated for weakness. As per son, for the past 3 weeks patient has appeared more tired than usual, has been watching patient via video camera and does not appear like her normal self. Also has been having some period of confusion over the past 3 months. Patient lives at home with her . Upon evaluation, patient is AAOx3, NAD, endorses she is feeling well, ambulates with assistant without difficulty. Only endorses a mild cough that stated today. RVP panel is negative. Labs unremarkable. Hemodynamically stable. CT-Head: No acute intracranial hemorrhage, mass effect, or midline shift.  Patient is being placed on observation as family member does not feel comfortable for patient to go home.        Problem/Plan - 1:  ·  Problem: General weakness.   ·  Plan: 3 weeks of progressive weakness   No fall, No trauma   - IV-NS   - Monitor  - DVT Prophylaxis    JOSHUA placement.     Problem/Plan - 2:  ·  Problem: Hypertension.   ·  Plan: Normotensive   Continue home meds   - Lisinopril   - Metoprolol succ  - Monitor BP       Problem/Plan - 3:  ·  Problem: Diabetes mellitus.   ·  Plan: ISS with hypoglycemia protocol   - F/U A1c.     Problem/Plan - 4:  ·  Problem: CAD (coronary artery disease).   ·  Plan: Continue home meds   - Plavix   - Statin.     Problem/Plan - 5:  ·  Problem: Hypothyroid.   ·  Plan: Continue home meds   - Synthroid   - F/U Labs.    Stable for JOSHUA placement  Pt in agreement

## 2024-07-19 NOTE — PROGRESS NOTE ADULT - SUBJECTIVE AND OBJECTIVE BOX
Patient is a 89y old  Female who presents with a chief complaint of Weakness (15 Jul 2024 03:11)    INTERVAL HPI/OVERNIGHT EVENTS:    MEDICATIONS  (STANDING):  atorvastatin 10 milliGRAM(s) Oral at bedtime  clopidogrel Tablet 75 milliGRAM(s) Oral daily  dextrose 5%. 1000 milliLiter(s) (50 mL/Hr) IV Continuous <Continuous>  dextrose 5%. 1000 milliLiter(s) (100 mL/Hr) IV Continuous <Continuous>  dextrose 50% Injectable 12.5 Gram(s) IV Push once  dextrose 50% Injectable 25 Gram(s) IV Push once  dextrose 50% Injectable 25 Gram(s) IV Push once  glucagon  Injectable 1 milliGRAM(s) IntraMuscular once  heparin   Injectable 5000 Unit(s) SubCutaneous every 12 hours  insulin lispro (ADMELOG) corrective regimen sliding scale   SubCutaneous three times a day before meals  levothyroxine 75 MICROGram(s) Oral daily  lisinopril 40 milliGRAM(s) Oral daily  metoprolol succinate ER 50 milliGRAM(s) Oral daily  pantoprazole    Tablet 40 milliGRAM(s) Oral before breakfast  potassium chloride    Tablet ER 40 milliEquivalent(s) Oral every 4 hours    MEDICATIONS  (PRN):  acetaminophen     Tablet .. 650 milliGRAM(s) Oral every 6 hours PRN Temp greater or equal to 38C (100.4F), Mild Pain (1 - 3)  aluminum hydroxide/magnesium hydroxide/simethicone Suspension 30 milliLiter(s) Oral every 4 hours PRN Dyspepsia  dextrose Oral Gel 15 Gram(s) Oral once PRN Blood Glucose LESS THAN 70 milliGRAM(s)/deciliter  melatonin 3 milliGRAM(s) Oral at bedtime PRN Insomnia  ondansetron Injectable 4 milliGRAM(s) IV Push every 8 hours PRN Nausea and/or Vomiting    Allergies    No Known Allergies    Intolerances      REVIEW OF SYSTEMS:  All other systems reviewed and are negative    Vital Signs Last 24 Hrs  T(C): 36.7 (16 Jul 2024 10:53), Max: 36.9 (16 Jul 2024 05:54)  T(F): 98.1 (16 Jul 2024 10:53), Max: 98.4 (16 Jul 2024 05:54)  HR: 64 (16 Jul 2024 10:53) (60 - 92)  BP: 109/74 (16 Jul 2024 10:53) (87/49 - 129/72)  BP(mean): --  RR: 17 (16 Jul 2024 10:53) (17 - 18)  SpO2: 96% (16 Jul 2024 10:53) (95% - 99%)    Parameters below as of 16 Jul 2024 10:53  Patient On (Oxygen Delivery Method): room air      Daily     Daily   I&O's Summary    CAPILLARY BLOOD GLUCOSE      POCT Blood Glucose.: 187 mg/dL (16 Jul 2024 11:14)  POCT Blood Glucose.: 108 mg/dL (16 Jul 2024 08:02)  POCT Blood Glucose.: 107 mg/dL (15 Jul 2024 22:40)  POCT Blood Glucose.: 119 mg/dL (15 Jul 2024 16:49)    PHYSICAL EXAM:  GENERAL: NAD,    HEAD:  Atraumatic, Normocephalic  EYES: EOMI, PERRLA, conjunctiva and sclera clear  ENMT: No tonsillar erythema, exudates, or enlargement; Moist mucous membranes, Good dentition, No lesions  NECK: Supple, No JVD, Normal thyroid  NERVOUS SYSTEM:  Alert & Oriented X3, Good concentration; Motor Strength 5/5 B/L upper and lower extremities; DTRs 2+ intact and symmetric  CHEST/LUNG: Clear to percussion bilaterally; No rales, rhonchi, wheezing, or rubs  HEART: Regular rate and rhythm; No murmurs, rubs, or gallops  ABDOMEN: Soft, Nontender, Nondistended; Bowel sounds present  EXTREMITIES:  2+ Peripheral Pulses, No clubbing, cyanosis, or edema  LYMPH: No lymphadenopathy noted  SKIN: No rashes or lesions    Labs                          12.2   5.20  )-----------( 173      ( 16 Jul 2024 07:28 )             35.8     07-16    134<L>  |  97  |  17  ----------------------------<  117<H>  3.1<L>   |  29  |  0.84    Ca    9.4      16 Jul 2024 07:28  Mg     1.8     07-14    TPro  6.8  /  Alb  3.3  /  TBili  0.5  /  DBili  x   /  AST  26  /  ALT  22  /  AlkPhos  47  07-16          Urinalysis Basic - ( 16 Jul 2024 07:28 )    Color: x / Appearance: x / SG: x / pH: x  Gluc: 117 mg/dL / Ketone: x  / Bili: x / Urobili: x   Blood: x / Protein: x / Nitrite: x   Leuk Esterase: x / RBC: x / WBC x   Sq Epi: x / Non Sq Epi: x / Bacteria: x        Culture - Urine (collected 14 Jul 2024 22:28)  Source: Clean Catch Clean Catch (Midstream)  Final Report (16 Jul 2024 08:46):    <10,000 CFU/mL Normal Urogenital Jessica                DVT prophylaxis: > Lovenox 40mg SQ daily  > Heparin   > SCD's
                          Patient: HERMELINDO HERNANDEZ [id number] 89y Female                            Hospitalist Attending Note    No complaints    ____________________PHYSICAL EXAM:  GENERAL:  NAD Alert and Oriented x 2 to person, place.   HEENT: NCAT  CARDIOVASCULAR:  S1, S2  LUNGS: CTAB  ABDOMEN:  soft, (-) tenderness, (-) distension, (+) bowel sounds, (-) guarding, (-) rebound (-) rigidity  EXTREMITIES:  no cyanosis / clubbing / edema.   ____________________    VITALS:  Vital Signs Last 24 Hrs  T(C): 36.5 (19 Jul 2024 04:48), Max: 36.7 (18 Jul 2024 10:46)  T(F): 97.7 (19 Jul 2024 04:48), Max: 98.1 (18 Jul 2024 10:46)  HR: 76 (19 Jul 2024 04:48) (62 - 88)  BP: 125/68 (19 Jul 2024 04:48) (90/58 - 149/75)  BP(mean): --  RR: 18 (19 Jul 2024 04:48) (18 - 19)  SpO2: 97% (19 Jul 2024 04:48) (96% - 100%)    Parameters below as of 19 Jul 2024 04:48  Patient On (Oxygen Delivery Method): room air     Daily     Daily   CAPILLARY BLOOD GLUCOSE      POCT Blood Glucose.: 127 mg/dL (18 Jul 2024 21:11)  POCT Blood Glucose.: 91 mg/dL (18 Jul 2024 15:52)  POCT Blood Glucose.: 154 mg/dL (18 Jul 2024 11:26)    I&O's Summary    18 Jul 2024 07:01  -  19 Jul 2024 07:00  --------------------------------------------------------  IN: 840 mL / OUT: 0 mL / NET: 840 mL        LABS:                        MEDICATIONS:  acetaminophen     Tablet .. 650 milliGRAM(s) Oral every 6 hours PRN  aluminum hydroxide/magnesium hydroxide/simethicone Suspension 30 milliLiter(s) Oral every 4 hours PRN  atorvastatin 10 milliGRAM(s) Oral at bedtime  clopidogrel Tablet 75 milliGRAM(s) Oral daily  dextrose 5%. 1000 milliLiter(s) IV Continuous <Continuous>  dextrose 5%. 1000 milliLiter(s) IV Continuous <Continuous>  dextrose 50% Injectable 12.5 Gram(s) IV Push once  dextrose 50% Injectable 25 Gram(s) IV Push once  dextrose 50% Injectable 25 Gram(s) IV Push once  dextrose Oral Gel 15 Gram(s) Oral once PRN  glucagon  Injectable 1 milliGRAM(s) IntraMuscular once  heparin   Injectable 5000 Unit(s) SubCutaneous every 12 hours  insulin lispro (ADMELOG) corrective regimen sliding scale   SubCutaneous three times a day before meals  levothyroxine 75 MICROGram(s) Oral daily  lisinopril 40 milliGRAM(s) Oral daily  melatonin 3 milliGRAM(s) Oral at bedtime PRN  metoprolol succinate ER 50 milliGRAM(s) Oral daily  ondansetron Injectable 4 milliGRAM(s) IV Push every 8 hours PRN  pantoprazole    Tablet 40 milliGRAM(s) Oral before breakfast    
                          Patient: HERMELINDO HERNANDEZ [id number] 89y Female                            Hospitalist Attending Note    Seen with [facility]  [id number]  No complaints    ____________________PHYSICAL EXAM:  GENERAL:  NAD Alert and Oriented x 2 to person, place.   HEENT: NCAT  CARDIOVASCULAR:  S1, S2  LUNGS: CTAB  ABDOMEN:  soft, (-) tenderness, (-) distension, (+) bowel sounds, (-) guarding, (-) rebound (-) rigidity  EXTREMITIES:  no cyanosis / clubbing / edema.   ____________________     VITALS:  Vital Signs Last 24 Hrs  T(C): 36.8 (17 Jul 2024 17:27), Max: 36.9 (17 Jul 2024 04:31)  T(F): 98.3 (17 Jul 2024 17:27), Max: 98.4 (17 Jul 2024 04:31)  HR: 81 (17 Jul 2024 17:27) (81 - 95)  BP: 106/63 (17 Jul 2024 17:27) (90/58 - 150/87)  BP(mean): --  RR: 18 (17 Jul 2024 17:27) (18 - 18)  SpO2: 96% (17 Jul 2024 17:27) (95% - 99%)    Parameters below as of 17 Jul 2024 17:27  Patient On (Oxygen Delivery Method): room air     Daily     Daily   CAPILLARY BLOOD GLUCOSE      POCT Blood Glucose.: 119 mg/dL (17 Jul 2024 21:18)  POCT Blood Glucose.: 106 mg/dL (17 Jul 2024 15:58)  POCT Blood Glucose.: 182 mg/dL (17 Jul 2024 11:06)  POCT Blood Glucose.: 117 mg/dL (17 Jul 2024 08:06)  POCT Blood Glucose.: 105 mg/dL (16 Jul 2024 23:41)    I&O's Summary      HISTORY:  PAST MEDICAL & SURGICAL HISTORY:  HTN (hypertension)      CAD (coronary artery disease)      Stroke      Allergies    No Known Allergies    Intolerances       LABS:                        12.2   5.20  )-----------( 173      ( 16 Jul 2024 07:28 )             35.8       Culture - Urine (collected 14 Jul 2024 22:28)  Source: Clean Catch Clean Catch (Midstream)  Final Report (16 Jul 2024 08:46):    <10,000 CFU/mL Normal Urogenital Jessica    07-17    136  |  103  |  12  ----------------------------<  113<H>  4.7   |  27  |  0.85    Ca    10.1      17 Jul 2024 07:25    TPro  6.8  /  Alb  3.3  /  TBili  0.5  /  DBili  x   /  AST  26  /  ALT  22  /  AlkPhos  47  07-16      LIVER FUNCTIONS - ( 16 Jul 2024 07:28 )  Alb: 3.3 g/dL / Pro: 6.8 gm/dL / ALK PHOS: 47 U/L / ALT: 22 U/L / AST: 26 U/L / GGT: x           Urinalysis Basic - ( 17 Jul 2024 07:25 )    Color: x / Appearance: x / SG: x / pH: x  Gluc: 113 mg/dL / Ketone: x  / Bili: x / Urobili: x   Blood: x / Protein: x / Nitrite: x   Leuk Esterase: x / RBC: x / WBC x   Sq Epi: x / Non Sq Epi: x / Bacteria: x          Culture - Urine (collected 14 Jul 2024 22:28)  Source: Clean Catch Clean Catch (Midstream)  Final Report (16 Jul 2024 08:46):    <10,000 CFU/mL Normal Urogenital Jessica          MEDICATIONS:  MEDICATIONS  (STANDING):  atorvastatin 10 milliGRAM(s) Oral at bedtime  clopidogrel Tablet 75 milliGRAM(s) Oral daily  dextrose 5%. 1000 milliLiter(s) (50 mL/Hr) IV Continuous <Continuous>  dextrose 5%. 1000 milliLiter(s) (100 mL/Hr) IV Continuous <Continuous>  dextrose 50% Injectable 12.5 Gram(s) IV Push once  dextrose 50% Injectable 25 Gram(s) IV Push once  dextrose 50% Injectable 25 Gram(s) IV Push once  glucagon  Injectable 1 milliGRAM(s) IntraMuscular once  heparin   Injectable 5000 Unit(s) SubCutaneous every 12 hours  insulin lispro (ADMELOG) corrective regimen sliding scale   SubCutaneous three times a day before meals  levothyroxine 75 MICROGram(s) Oral daily  lisinopril 40 milliGRAM(s) Oral daily  metoprolol succinate ER 50 milliGRAM(s) Oral daily  pantoprazole    Tablet 40 milliGRAM(s) Oral before breakfast    MEDICATIONS  (PRN):  acetaminophen     Tablet .. 650 milliGRAM(s) Oral every 6 hours PRN Temp greater or equal to 38C (100.4F), Mild Pain (1 - 3)  aluminum hydroxide/magnesium hydroxide/simethicone Suspension 30 milliLiter(s) Oral every 4 hours PRN Dyspepsia  dextrose Oral Gel 15 Gram(s) Oral once PRN Blood Glucose LESS THAN 70 milliGRAM(s)/deciliter  melatonin 3 milliGRAM(s) Oral at bedtime PRN Insomnia  ondansetron Injectable 4 milliGRAM(s) IV Push every 8 hours PRN Nausea and/or Vomiting  
                          Patient: HERMELINDO HERNANDEZ [id number] 89y Female                            Hospitalist Attending Note    No complaints    ____________________PHYSICAL EXAM:  GENERAL:  NAD Alert and Oriented x 2 to person, place.   HEENT: NCAT  CARDIOVASCULAR:  S1, S2  LUNGS: CTAB  ABDOMEN:  soft, (-) tenderness, (-) distension, (+) bowel sounds, (-) guarding, (-) rebound (-) rigidity  EXTREMITIES:  no cyanosis / clubbing / edema.   ____________________    VITALS:  Vital Signs Last 24 Hrs  T(C): 36.4 (18 Jul 2024 16:39), Max: 36.7 (18 Jul 2024 05:29)  T(F): 97.6 (18 Jul 2024 16:39), Max: 98.1 (18 Jul 2024 10:46)  HR: 62 (18 Jul 2024 16:39) (62 - 92)  BP: 149/75 (18 Jul 2024 16:39) (90/58 - 149/75)  BP(mean): --  RR: 18 (18 Jul 2024 16:39) (18 - 20)  SpO2: 98% (18 Jul 2024 16:39) (97% - 100%)    Parameters below as of 18 Jul 2024 16:39  Patient On (Oxygen Delivery Method): room air     Daily     Daily   CAPILLARY BLOOD GLUCOSE      POCT Blood Glucose.: 91 mg/dL (18 Jul 2024 15:52)  POCT Blood Glucose.: 154 mg/dL (18 Jul 2024 11:26)  POCT Blood Glucose.: 110 mg/dL (18 Jul 2024 08:24)  POCT Blood Glucose.: 119 mg/dL (17 Jul 2024 21:18)    I&O's Summary    17 Jul 2024 07:01  -  18 Jul 2024 07:00  --------------------------------------------------------  IN: 240 mL / OUT: 0 mL / NET: 240 mL    18 Jul 2024 07:01  -  18 Jul 2024 19:18  --------------------------------------------------------  IN: 840 mL / OUT: 0 mL / NET: 840 mL        LABS:    07-17    136  |  103  |  12  ----------------------------<  113<H>  4.7   |  27  |  0.85    Ca    10.1      17 Jul 2024 07:25          Urinalysis Basic - ( 17 Jul 2024 07:25 )    Color: x / Appearance: x / SG: x / pH: x  Gluc: 113 mg/dL / Ketone: x  / Bili: x / Urobili: x   Blood: x / Protein: x / Nitrite: x   Leuk Esterase: x / RBC: x / WBC x   Sq Epi: x / Non Sq Epi: x / Bacteria: x              MEDICATIONS:  acetaminophen     Tablet .. 650 milliGRAM(s) Oral every 6 hours PRN  aluminum hydroxide/magnesium hydroxide/simethicone Suspension 30 milliLiter(s) Oral every 4 hours PRN  atorvastatin 10 milliGRAM(s) Oral at bedtime  clopidogrel Tablet 75 milliGRAM(s) Oral daily  dextrose 5%. 1000 milliLiter(s) IV Continuous <Continuous>  dextrose 5%. 1000 milliLiter(s) IV Continuous <Continuous>  dextrose 50% Injectable 12.5 Gram(s) IV Push once  dextrose 50% Injectable 25 Gram(s) IV Push once  dextrose 50% Injectable 25 Gram(s) IV Push once  dextrose Oral Gel 15 Gram(s) Oral once PRN  glucagon  Injectable 1 milliGRAM(s) IntraMuscular once  heparin   Injectable 5000 Unit(s) SubCutaneous every 12 hours  insulin lispro (ADMELOG) corrective regimen sliding scale   SubCutaneous three times a day before meals  levothyroxine 75 MICROGram(s) Oral daily  lisinopril 40 milliGRAM(s) Oral daily  melatonin 3 milliGRAM(s) Oral at bedtime PRN  metoprolol succinate ER 50 milliGRAM(s) Oral daily  ondansetron Injectable 4 milliGRAM(s) IV Push every 8 hours PRN  pantoprazole    Tablet 40 milliGRAM(s) Oral before breakfast

## 2024-07-19 NOTE — DISCHARGE NOTE PROVIDER - NSDCMRMEDTOKEN_GEN_ALL_CORE_FT
atorvastatin 10 mg oral tablet: 1 tab(s) orally once a day (at bedtime)  Fortamet 1000 mg oral tablet, extended release: 1 tab(s) orally once a day  lisinopril 40 mg oral tablet: 1 tab(s) orally once a day  metoprolol succinate 50 mg oral tablet, extended release: 1 tab(s) orally once a day  Plavix 75 mg oral tablet: 1 tab(s) orally once a day  Protonix 40 mg oral delayed release tablet: 1 tab(s) orally once a day  Synthroid 75 mcg (0.075 mg) oral tablet: 1 tab(s) orally once a day

## 2024-07-19 NOTE — CHART NOTE - NSCHARTNOTEFT_GEN_A_CORE
seen and examined  await pt for dispo    Vital Signs Last 24 Hrs  T(C): 36.7 (15 Jul 2024 11:06), Max: 37.3 (15 Jul 2024 05:24)  T(F): 98.1 (15 Jul 2024 11:06), Max: 99.1 (15 Jul 2024 05:24)  HR: 66 (15 Jul 2024 11:06) (62 - 83)  BP: 103/72 (15 Jul 2024 11:06) (101/52 - 179/103)  BP(mean): 121 (14 Jul 2024 23:00) (121 - 121)  RR: 17 (15 Jul 2024 11:06) (16 - 20)  SpO2: 97% (15 Jul 2024 11:06) (95% - 100%)    Parameters below as of 15 Jul 2024 11:06  Patient On (Oxygen Delivery Method): room air                          11.5   5.39  )-----------( 155      ( 15 Jul 2024 09:10 )             33.1     07-15    131<L>  |  95<L>  |  21  ----------------------------<  175<H>  3.0<L>   |  28  |  0.85    Ca    9.1      15 Jul 2024 09:10  Mg     1.8     07-14    TPro  6.1  /  Alb  3.1<L>  /  TBili  0.6  /  DBili  x   /  AST  22  /  ALT  19  /  AlkPhos  41  07-15      Urinalysis Basic - ( 15 Jul 2024 09:10 )    Color: x / Appearance: x / SG: x / pH: x  Gluc: 175 mg/dL / Ketone: x  / Bili: x / Urobili: x   Blood: x / Protein: x / Nitrite: x   Leuk Esterase: x / RBC: x / WBC x   Sq Epi: x / Non Sq Epi: x / Bacteria: x
Plan of care was d/w son Leoncio via phone.   All questions were answered.
SW Note:  Pt. is being discharged on this date to [facility] for [facility] via [facility] [phone] at 2pm. Pt.'s son , Leoncio aRymond, [phone], is aware and in agreement. No further sw intervention is needed.

## 2024-07-19 NOTE — PATIENT PROFILE ADULT - FALL HARM RISK - HARM RISK INTERVENTIONS
Assistance with ambulation/Assistance OOB with selected safe patient handling equipment/Communicate Risk of Fall with Harm to all staff/Discuss with provider need for PT consult/Monitor gait and stability/Reinforce activity limits and safety measures with patient and family/Tailored Fall Risk Interventions/Use of alarms - bed, chair and/or voice tab/Visual Cue: Yellow wristband and red socks/Bed in lowest position, wheels locked, appropriate side rails in place/Call bell, personal items and telephone in reach/Instruct patient to call for assistance before getting out of bed or chair/Non-slip footwear when patient is out of bed/[place] to call system/Physically safe environment - no spills, clutter or unnecessary equipment/Purposeful Proactive Rounding/Room/bathroom lighting operational, light cord in reach

## 2024-07-19 NOTE — DISCHARGE NOTE NURSING/CASE MANAGEMENT/SOCIAL WORK - PATIENT PORTAL LINK FT
You can access the FollowMyHealth Patient Portal offered by [facility] [place] by registering at the following website: [email or web address]. By joining [employer]’s FollowMyHealth portal, you will also be able to view your health information using other applications (apps) compatible with our system.

## 2024-07-19 NOTE — DISCHARGE NOTE PROVIDER - NSDCFUADDINST_GEN_ALL_CORE_FT
It is important to see your primary physician as well as any specialty physicians within the next week to perform a comprehensive medical review.  Call their offices for an appointment as soon as you leave the hospital.  You will also need to see them for renewal of your medications.  If have any difficulty following with a physician, contact the [facility] [phone] or via [email or web address].   To obtain your results, you can access the [facility] Patient Portal at [email or web address].  Your medical issues appear to be stable at this time, but if your symptoms recur or worsen, contact your physicians and/or return to the hospital if necessary.  If you encounter any issues or questions with your medication, call your physicians before stopping the medication.  Do not drive.  Limit your diet to 2 grams of sodium daily.

## 2024-07-19 NOTE — DISCHARGE NOTE PROVIDER - NSDCFUADDAPPT_GEN_ALL_CORE_FT
APPTS ARE READY TO BE MADE: [X] YES    Best Family or Patient Contact (if needed):    Additional Information about above appointments (if needed):    1:   2:   3:     Other comments or requests:    APPTS ARE READY TO BE MADE: [X] YES    Best Family or Patient Contact (if needed):    Additional Information about above appointments (if needed):    1:   2:   3:     Other comments or requests:       Patient is being discharged to rehab/hospice. Caregiver will arrange follow up.

## 2024-07-19 NOTE — DISCHARGE NOTE PROVIDER - NSDCCPCAREPLAN_GEN_ALL_CORE_FT
PRINCIPAL DISCHARGE DIAGNOSIS  Diagnosis: Generalized weakness  Assessment and Plan of Treatment:       SECONDARY DISCHARGE DIAGNOSES  Diagnosis: Hypertension  Assessment and Plan of Treatment:     Diagnosis: CAD (coronary artery disease)  Assessment and Plan of Treatment:     Diagnosis: Diabetes mellitus  Assessment and Plan of Treatment:     Diagnosis: Hypothyroid  Assessment and Plan of Treatment:     Diagnosis: Confusion  Assessment and Plan of Treatment:

## 2024-07-31 DIAGNOSIS — E03.9 HYPOTHYROIDISM, UNSPECIFIED: ICD-10-CM

## 2024-07-31 DIAGNOSIS — E11.9 TYPE 2 DIABETES MELLITUS WITHOUT COMPLICATIONS: ICD-10-CM

## 2024-07-31 DIAGNOSIS — Z79.02 LONG TERM (CURRENT) USE OF ANTITHROMBOTICS/ANTIPLATELETS: ICD-10-CM

## 2024-07-31 DIAGNOSIS — R53.1 WEAKNESS: ICD-10-CM

## 2024-07-31 DIAGNOSIS — E87.6 HYPOKALEMIA: ICD-10-CM

## 2024-07-31 DIAGNOSIS — Z86.73 PERSONAL HISTORY OF TRANSIENT ISCHEMIC ATTACK (TIA), AND CEREBRAL INFARCTION WITHOUT RESIDUAL DEFICITS: ICD-10-CM

## 2024-07-31 DIAGNOSIS — I10 ESSENTIAL (PRIMARY) HYPERTENSION: ICD-10-CM

## 2024-07-31 DIAGNOSIS — R41.0 DISORIENTATION, UNSPECIFIED: ICD-10-CM

## 2024-07-31 DIAGNOSIS — Z95.5 PRESENCE OF CORONARY ANGIOPLASTY IMPLANT AND GRAFT: ICD-10-CM

## 2024-07-31 DIAGNOSIS — Z79.890 HORMONE REPLACEMENT THERAPY: ICD-10-CM

## 2024-07-31 DIAGNOSIS — I25.10 ATHEROSCLEROTIC HEART DISEASE OF NATIVE CORONARY ARTERY WITHOUT ANGINA PECTORIS: ICD-10-CM
